# Patient Record
Sex: FEMALE | Race: OTHER | NOT HISPANIC OR LATINO | URBAN - METROPOLITAN AREA
[De-identification: names, ages, dates, MRNs, and addresses within clinical notes are randomized per-mention and may not be internally consistent; named-entity substitution may affect disease eponyms.]

---

## 2017-06-29 PROBLEM — Z00.00 ENCOUNTER FOR PREVENTIVE HEALTH EXAMINATION: Status: ACTIVE | Noted: 2017-06-29

## 2017-07-05 ENCOUNTER — OUTPATIENT (OUTPATIENT)
Dept: OUTPATIENT SERVICES | Facility: HOSPITAL | Age: 82
LOS: 1 days | Discharge: HOME | End: 2017-07-05

## 2017-07-05 ENCOUNTER — APPOINTMENT (OUTPATIENT)
Dept: SPEECH THERAPY | Facility: HOSPITAL | Age: 82
End: 2017-07-05

## 2017-07-05 ENCOUNTER — APPOINTMENT (OUTPATIENT)
Dept: RADIOLOGY | Facility: HOSPITAL | Age: 82
End: 2017-07-05

## 2017-07-05 ENCOUNTER — OUTPATIENT (OUTPATIENT)
Dept: OUTPATIENT SERVICES | Facility: HOSPITAL | Age: 82
LOS: 1 days | End: 2017-07-05

## 2017-07-05 DIAGNOSIS — R13.10 DYSPHAGIA, UNSPECIFIED: ICD-10-CM

## 2017-07-07 DIAGNOSIS — R13.19 OTHER DYSPHAGIA: ICD-10-CM

## 2018-09-02 ENCOUNTER — INPATIENT (INPATIENT)
Facility: HOSPITAL | Age: 83
LOS: 1 days | Discharge: HOME | End: 2018-09-04
Attending: HOSPITALIST | Admitting: HOSPITALIST
Payer: COMMERCIAL

## 2018-09-02 VITALS
TEMPERATURE: 99 F | SYSTOLIC BLOOD PRESSURE: 121 MMHG | DIASTOLIC BLOOD PRESSURE: 60 MMHG | OXYGEN SATURATION: 99 % | HEART RATE: 68 BPM | RESPIRATION RATE: 18 BRPM

## 2018-09-02 DIAGNOSIS — E78.5 HYPERLIPIDEMIA, UNSPECIFIED: ICD-10-CM

## 2018-09-02 DIAGNOSIS — Z29.9 ENCOUNTER FOR PROPHYLACTIC MEASURES, UNSPECIFIED: ICD-10-CM

## 2018-09-02 DIAGNOSIS — J18.9 PNEUMONIA, UNSPECIFIED ORGANISM: ICD-10-CM

## 2018-09-02 DIAGNOSIS — G62.9 POLYNEUROPATHY, UNSPECIFIED: ICD-10-CM

## 2018-09-02 DIAGNOSIS — I10 ESSENTIAL (PRIMARY) HYPERTENSION: ICD-10-CM

## 2018-09-02 DIAGNOSIS — D56.3 THALASSEMIA MINOR: ICD-10-CM

## 2018-09-02 LAB
ALBUMIN SERPL ELPH-MCNC: 3.7 G/DL — SIGNIFICANT CHANGE UP (ref 3.3–5)
ALP SERPL-CCNC: 68 U/L — SIGNIFICANT CHANGE UP (ref 40–120)
ALT FLD-CCNC: 22 U/L — SIGNIFICANT CHANGE UP (ref 4–33)
ANISOCYTOSIS BLD QL: SLIGHT — SIGNIFICANT CHANGE UP
APPEARANCE UR: CLEAR — SIGNIFICANT CHANGE UP
AST SERPL-CCNC: 26 U/L — SIGNIFICANT CHANGE UP (ref 4–32)
B PERT DNA SPEC QL NAA+PROBE: SIGNIFICANT CHANGE UP
BACTERIA # UR AUTO: NEGATIVE — SIGNIFICANT CHANGE UP
BASE EXCESS BLDV CALC-SCNC: 1 MMOL/L — SIGNIFICANT CHANGE UP
BASOPHILS # BLD AUTO: 0.02 K/UL — SIGNIFICANT CHANGE UP (ref 0–0.2)
BASOPHILS NFR BLD AUTO: 0.2 % — SIGNIFICANT CHANGE UP (ref 0–2)
BASOPHILS NFR SPEC: 0 % — SIGNIFICANT CHANGE UP (ref 0–2)
BILIRUB SERPL-MCNC: 0.8 MG/DL — SIGNIFICANT CHANGE UP (ref 0.2–1.2)
BILIRUB UR-MCNC: NEGATIVE — SIGNIFICANT CHANGE UP
BLASTS # FLD: 0 % — SIGNIFICANT CHANGE UP (ref 0–0)
BLOOD GAS VENOUS - CREATININE: 0.66 MG/DL — SIGNIFICANT CHANGE UP (ref 0.5–1.3)
BLOOD UR QL VISUAL: SIGNIFICANT CHANGE UP
BUN SERPL-MCNC: 26 MG/DL — HIGH (ref 7–23)
C PNEUM DNA SPEC QL NAA+PROBE: NOT DETECTED — SIGNIFICANT CHANGE UP
CALCIUM SERPL-MCNC: 9.6 MG/DL — SIGNIFICANT CHANGE UP (ref 8.4–10.5)
CHLORIDE BLDV-SCNC: 110 MMOL/L — HIGH (ref 96–108)
CHLORIDE SERPL-SCNC: 104 MMOL/L — SIGNIFICANT CHANGE UP (ref 98–107)
CO2 SERPL-SCNC: 23 MMOL/L — SIGNIFICANT CHANGE UP (ref 22–31)
COLOR SPEC: SIGNIFICANT CHANGE UP
CREAT SERPL-MCNC: 0.79 MG/DL — SIGNIFICANT CHANGE UP (ref 0.5–1.3)
DACRYOCYTES BLD QL SMEAR: SLIGHT — SIGNIFICANT CHANGE UP
ELLIPTOCYTES BLD QL SMEAR: SLIGHT — SIGNIFICANT CHANGE UP
EOSINOPHIL # BLD AUTO: 0.02 K/UL — SIGNIFICANT CHANGE UP (ref 0–0.5)
EOSINOPHIL NFR BLD AUTO: 0.2 % — SIGNIFICANT CHANGE UP (ref 0–6)
EOSINOPHIL NFR FLD: 0.9 % — SIGNIFICANT CHANGE UP (ref 0–6)
FLUAV H1 2009 PAND RNA SPEC QL NAA+PROBE: NOT DETECTED — SIGNIFICANT CHANGE UP
FLUAV H1 RNA SPEC QL NAA+PROBE: NOT DETECTED — SIGNIFICANT CHANGE UP
FLUAV H3 RNA SPEC QL NAA+PROBE: NOT DETECTED — SIGNIFICANT CHANGE UP
FLUAV SUBTYP SPEC NAA+PROBE: SIGNIFICANT CHANGE UP
FLUBV RNA SPEC QL NAA+PROBE: NOT DETECTED — SIGNIFICANT CHANGE UP
GAS PNL BLDV: 138 MMOL/L — SIGNIFICANT CHANGE UP (ref 136–146)
GIANT PLATELETS BLD QL SMEAR: PRESENT — SIGNIFICANT CHANGE UP
GLUCOSE BLDV-MCNC: 105 — HIGH (ref 70–99)
GLUCOSE SERPL-MCNC: 110 MG/DL — HIGH (ref 70–99)
GLUCOSE UR-MCNC: NEGATIVE — SIGNIFICANT CHANGE UP
GRAM STN SPT: SIGNIFICANT CHANGE UP
HADV DNA SPEC QL NAA+PROBE: NOT DETECTED — SIGNIFICANT CHANGE UP
HCO3 BLDV-SCNC: 25 MMOL/L — SIGNIFICANT CHANGE UP (ref 20–27)
HCOV 229E RNA SPEC QL NAA+PROBE: NOT DETECTED — SIGNIFICANT CHANGE UP
HCOV HKU1 RNA SPEC QL NAA+PROBE: NOT DETECTED — SIGNIFICANT CHANGE UP
HCOV NL63 RNA SPEC QL NAA+PROBE: NOT DETECTED — SIGNIFICANT CHANGE UP
HCOV OC43 RNA SPEC QL NAA+PROBE: NOT DETECTED — SIGNIFICANT CHANGE UP
HCT VFR BLD CALC: 32.2 % — LOW (ref 34.5–45)
HCT VFR BLDV CALC: 32.6 % — LOW (ref 34.5–45)
HGB BLD-MCNC: 10.2 G/DL — LOW (ref 11.5–15.5)
HGB BLDV-MCNC: 10.6 G/DL — LOW (ref 11.5–15.5)
HMPV RNA SPEC QL NAA+PROBE: NOT DETECTED — SIGNIFICANT CHANGE UP
HPIV1 RNA SPEC QL NAA+PROBE: NOT DETECTED — SIGNIFICANT CHANGE UP
HPIV2 RNA SPEC QL NAA+PROBE: NOT DETECTED — SIGNIFICANT CHANGE UP
HPIV3 RNA SPEC QL NAA+PROBE: NOT DETECTED — SIGNIFICANT CHANGE UP
HPIV4 RNA SPEC QL NAA+PROBE: NOT DETECTED — SIGNIFICANT CHANGE UP
HYALINE CASTS # UR AUTO: NEGATIVE — SIGNIFICANT CHANGE UP
HYPOCHROMIA BLD QL: SIGNIFICANT CHANGE UP
IMM GRANULOCYTES # BLD AUTO: 0.06 # — SIGNIFICANT CHANGE UP
IMM GRANULOCYTES NFR BLD AUTO: 0.6 % — SIGNIFICANT CHANGE UP (ref 0–1.5)
KETONES UR-MCNC: NEGATIVE — SIGNIFICANT CHANGE UP
LACTATE BLDV-MCNC: 1.2 MMOL/L — SIGNIFICANT CHANGE UP (ref 0.5–2)
LEUKOCYTE ESTERASE UR-ACNC: SIGNIFICANT CHANGE UP
LYMPHOCYTES # BLD AUTO: 0.97 K/UL — LOW (ref 1–3.3)
LYMPHOCYTES # BLD AUTO: 9.4 % — LOW (ref 13–44)
LYMPHOCYTES NFR SPEC AUTO: 13.9 % — SIGNIFICANT CHANGE UP (ref 13–44)
M PNEUMO DNA SPEC QL NAA+PROBE: NOT DETECTED — SIGNIFICANT CHANGE UP
MCHC RBC-ENTMCNC: 20 PG — LOW (ref 27–34)
MCHC RBC-ENTMCNC: 31.7 % — LOW (ref 32–36)
MCV RBC AUTO: 63.3 FL — LOW (ref 80–100)
METAMYELOCYTES # FLD: 0 % — SIGNIFICANT CHANGE UP (ref 0–1)
MICROCYTES BLD QL: SIGNIFICANT CHANGE UP
MONOCYTES # BLD AUTO: 1.03 K/UL — HIGH (ref 0–0.9)
MONOCYTES NFR BLD AUTO: 10 % — SIGNIFICANT CHANGE UP (ref 2–14)
MONOCYTES NFR BLD: 5.2 % — SIGNIFICANT CHANGE UP (ref 2–9)
MYELOCYTES NFR BLD: 0 % — SIGNIFICANT CHANGE UP (ref 0–0)
NEUTROPHIL AB SER-ACNC: 75.7 % — SIGNIFICANT CHANGE UP (ref 43–77)
NEUTROPHILS # BLD AUTO: 8.21 K/UL — HIGH (ref 1.8–7.4)
NEUTROPHILS NFR BLD AUTO: 79.6 % — HIGH (ref 43–77)
NEUTS BAND # BLD: 1.7 % — SIGNIFICANT CHANGE UP (ref 0–6)
NITRITE UR-MCNC: NEGATIVE — SIGNIFICANT CHANGE UP
NRBC # FLD: 0 — SIGNIFICANT CHANGE UP
OTHER - HEMATOLOGY %: 0 — SIGNIFICANT CHANGE UP
PCO2 BLDV: 39 MMHG — LOW (ref 41–51)
PH BLDV: 7.43 PH — SIGNIFICANT CHANGE UP (ref 7.32–7.43)
PH UR: 7.5 — SIGNIFICANT CHANGE UP (ref 5–8)
PLATELET # BLD AUTO: 193 K/UL — SIGNIFICANT CHANGE UP (ref 150–400)
PLATELET COUNT - ESTIMATE: NORMAL — SIGNIFICANT CHANGE UP
PMV BLD: 10.5 FL — SIGNIFICANT CHANGE UP (ref 7–13)
PO2 BLDV: 30 MMHG — LOW (ref 35–40)
POIKILOCYTOSIS BLD QL AUTO: SLIGHT — SIGNIFICANT CHANGE UP
POLYCHROMASIA BLD QL SMEAR: SLIGHT — SIGNIFICANT CHANGE UP
POTASSIUM BLDV-SCNC: 4.2 MMOL/L — SIGNIFICANT CHANGE UP (ref 3.4–4.5)
POTASSIUM SERPL-MCNC: 4.4 MMOL/L — SIGNIFICANT CHANGE UP (ref 3.5–5.3)
POTASSIUM SERPL-SCNC: 4.4 MMOL/L — SIGNIFICANT CHANGE UP (ref 3.5–5.3)
PROMYELOCYTES # FLD: 0 % — SIGNIFICANT CHANGE UP (ref 0–0)
PROT SERPL-MCNC: 6.8 G/DL — SIGNIFICANT CHANGE UP (ref 6–8.3)
PROT UR-MCNC: NEGATIVE — SIGNIFICANT CHANGE UP
RBC # BLD: 5.09 M/UL — SIGNIFICANT CHANGE UP (ref 3.8–5.2)
RBC # FLD: 16.3 % — HIGH (ref 10.3–14.5)
RBC CASTS # UR COMP ASSIST: HIGH (ref 0–?)
RSV RNA SPEC QL NAA+PROBE: NOT DETECTED — SIGNIFICANT CHANGE UP
RV+EV RNA SPEC QL NAA+PROBE: NOT DETECTED — SIGNIFICANT CHANGE UP
SAO2 % BLDV: 50 % — LOW (ref 60–85)
SODIUM SERPL-SCNC: 141 MMOL/L — SIGNIFICANT CHANGE UP (ref 135–145)
SP GR SPEC: 1.01 — SIGNIFICANT CHANGE UP (ref 1–1.04)
SPECIMEN SOURCE: SIGNIFICANT CHANGE UP
SQUAMOUS # UR AUTO: SIGNIFICANT CHANGE UP
TROPONIN T, HIGH SENSITIVITY: 22 NG/L — SIGNIFICANT CHANGE UP (ref ?–14)
TROPONIN T, HIGH SENSITIVITY: 23 NG/L — SIGNIFICANT CHANGE UP (ref ?–14)
UROBILINOGEN FLD QL: NORMAL — SIGNIFICANT CHANGE UP
VARIANT LYMPHS # BLD: 2.6 % — SIGNIFICANT CHANGE UP
WBC # BLD: 10.31 K/UL — SIGNIFICANT CHANGE UP (ref 3.8–10.5)
WBC # FLD AUTO: 10.31 K/UL — SIGNIFICANT CHANGE UP (ref 3.8–10.5)
WBC UR QL: SIGNIFICANT CHANGE UP (ref 0–?)

## 2018-09-02 PROCEDURE — 71046 X-RAY EXAM CHEST 2 VIEWS: CPT | Mod: 26

## 2018-09-02 PROCEDURE — 99254 IP/OBS CNSLTJ NEW/EST MOD 60: CPT

## 2018-09-02 PROCEDURE — 71250 CT THORAX DX C-: CPT | Mod: 26

## 2018-09-02 RX ORDER — LOSARTAN POTASSIUM 100 MG/1
1 TABLET, FILM COATED ORAL
Qty: 0 | Refills: 0 | COMMUNITY

## 2018-09-02 RX ORDER — SIMVASTATIN 20 MG/1
20 TABLET, FILM COATED ORAL AT BEDTIME
Qty: 0 | Refills: 0 | Status: DISCONTINUED | OUTPATIENT
Start: 2018-09-02 | End: 2018-09-04

## 2018-09-02 RX ORDER — INFLUENZA VIRUS VACCINE 15; 15; 15; 15 UG/.5ML; UG/.5ML; UG/.5ML; UG/.5ML
0.5 SUSPENSION INTRAMUSCULAR ONCE
Qty: 0 | Refills: 0 | Status: COMPLETED | OUTPATIENT
Start: 2018-09-02 | End: 2018-09-04

## 2018-09-02 RX ORDER — GABAPENTIN 400 MG/1
300 CAPSULE ORAL
Qty: 0 | Refills: 0 | Status: DISCONTINUED | OUTPATIENT
Start: 2018-09-02 | End: 2018-09-04

## 2018-09-02 RX ORDER — GABAPENTIN 400 MG/1
1 CAPSULE ORAL
Qty: 0 | Refills: 0 | COMMUNITY

## 2018-09-02 RX ORDER — NIFEDIPINE 30 MG
30 TABLET, EXTENDED RELEASE 24 HR ORAL DAILY
Qty: 0 | Refills: 0 | Status: DISCONTINUED | OUTPATIENT
Start: 2018-09-02 | End: 2018-09-04

## 2018-09-02 RX ORDER — LOSARTAN POTASSIUM 100 MG/1
100 TABLET, FILM COATED ORAL DAILY
Qty: 0 | Refills: 0 | Status: DISCONTINUED | OUTPATIENT
Start: 2018-09-02 | End: 2018-09-04

## 2018-09-02 RX ORDER — SIMVASTATIN 20 MG/1
1 TABLET, FILM COATED ORAL
Qty: 0 | Refills: 0 | COMMUNITY

## 2018-09-02 RX ORDER — GABAPENTIN 400 MG/1
300 CAPSULE ORAL
Qty: 0 | Refills: 0 | COMMUNITY

## 2018-09-02 RX ORDER — ALBUTEROL 90 UG/1
2.5 AEROSOL, METERED ORAL ONCE
Qty: 0 | Refills: 0 | Status: COMPLETED | OUTPATIENT
Start: 2018-09-02 | End: 2018-09-02

## 2018-09-02 RX ORDER — DEXAMETHASONE 0.5 MG/5ML
10 ELIXIR ORAL ONCE
Qty: 0 | Refills: 0 | Status: COMPLETED | OUTPATIENT
Start: 2018-09-02 | End: 2018-09-02

## 2018-09-02 RX ORDER — GABAPENTIN 400 MG/1
300 CAPSULE ORAL
Qty: 0 | Refills: 0 | Status: DISCONTINUED | OUTPATIENT
Start: 2018-09-02 | End: 2018-09-02

## 2018-09-02 RX ORDER — GABAPENTIN 400 MG/1
300 CAPSULE ORAL THREE TIMES A DAY
Qty: 0 | Refills: 0 | Status: DISCONTINUED | OUTPATIENT
Start: 2018-09-02 | End: 2018-09-02

## 2018-09-02 RX ADMIN — GABAPENTIN 300 MILLIGRAM(S): 400 CAPSULE ORAL at 18:22

## 2018-09-02 RX ADMIN — Medication 102 MILLIGRAM(S): at 08:54

## 2018-09-02 RX ADMIN — ALBUTEROL 2.5 MILLIGRAM(S): 90 AEROSOL, METERED ORAL at 08:07

## 2018-09-02 RX ADMIN — SIMVASTATIN 20 MILLIGRAM(S): 20 TABLET, FILM COATED ORAL at 21:17

## 2018-09-02 RX ADMIN — LOSARTAN POTASSIUM 100 MILLIGRAM(S): 100 TABLET, FILM COATED ORAL at 18:22

## 2018-09-02 NOTE — ED ADULT NURSE NOTE - OBJECTIVE STATEMENT
Patient received in room #10 c/o intermittent fevers, chest/throat/ear pain when coughing. Patient A&Ox3, ambulatory. Patient reports last taking motrin and tylenol around midnight. Patient is hard of hearing, family at bedside. Vs as noted. 20G IV placed in right ac, labs drawn and sent. Will monitor.

## 2018-09-02 NOTE — H&P ADULT - PROBLEM SELECTOR PLAN 2
concern for RUBY seen on CT chest   -will obtain sputum culture x2; AFB   -may need pulmonology consult for possible bronchoscopy   -consider ID consult for antibiotic regimen / duration; can consider Azithromycin + Rifampin (check EKG for baseline QT interval)

## 2018-09-02 NOTE — H&P ADULT - RS GEN PE MLT RESP DETAILS PC
good air movement/normal/airway patent/respirations non-labored/breath sounds equal/clear to auscultation bilaterally

## 2018-09-02 NOTE — ED ADULT TRIAGE NOTE - CHIEF COMPLAINT QUOTE
pt. came in c/o mid chest pain started 3 hrs ago after coughing. pt. also c/o feeling SOB, productive cough (yellowish phlegm), back pain and intermittent fever x 3-4 days. pt. speaks in full sentences, lung sounds clear in triage. PMHx: HTN, Osteoporosis

## 2018-09-02 NOTE — H&P ADULT - FAMILY HISTORY
Sibling  Still living? Unknown  Family history of Parkinson disease, Age at diagnosis: Age Unknown  Family history of primary TB, Age at diagnosis: Age Unknown  Family history of colon cancer, Age at diagnosis: Age Unknown  Family history of diabetes mellitus, Age at diagnosis: Age Unknown

## 2018-09-02 NOTE — PATIENT PROFILE ADULT. - ABILITY TO HEAR (WITH HEARING AID OR HEARING APPLIANCE IF NORMALLY USED):
Mildly to Moderately Impaired: difficulty hearing in some environments or speaker may need to increase volume or speak distinctly/uses hearing aides at times

## 2018-09-02 NOTE — H&P ADULT - NSHPLABSRESULTS_GEN_ALL_CORE
.  Labs reviewed personally.                          10.2   10.31 )-----------( 193      ( 02 Sep 2018 07:26 )             32.2     Hgb Trend: 10.2<--      141  |  104  |  26<H>  ----------------------------<  110<H>  4.4   |  23  |  0.79    Ca    9.6      02 Sep 2018 07:26    TPro  6.8  /  Alb  3.7  /  TBili  0.8  /  DBili  x   /  AST  26  /  ALT  22  /  AlkPhos  68      Creatinine Trend: 0.79<--    Urinalysis Basic - ( 02 Sep 2018 07:30 )    Color: LIGHT YELLOW / Appearance: CLEAR / S.012 / pH: 7.5  Gluc: NEGATIVE / Ketone: NEGATIVE  / Bili: NEGATIVE / Urobili: NORMAL   Blood: SMALL / Protein: NEGATIVE / Nitrite: NEGATIVE   Leuk Esterase: MODERATE / RBC: 11-25 / WBC 10-20   Sq Epi: OCC / Non Sq Epi: x / Bacteria: NEGATIVE        Imaging reviewed personally.

## 2018-09-02 NOTE — H&P ADULT - HISTORY OF PRESENT ILLNESS
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 4 days of cough. Patient is poor historian son at bedside able to assist in chronicity.     States that initially patient was evaluated for conjunctivitis on Wednesday Patient developed red eye initially one sided then bilateral w/ itching, tearing as well as slight sore throat and cough. Cough was productive yellow/green sputum w/o blood. Endorsed fevers, chills no night sweats. She went to urgent care center then to her PCP and was continued on eye drops. Patient continued to have fevers, and progressively worsening cough. She was subsequently brought to the hospital by her children. Patient has niece who has recently been ill w/ cough. Also patient has recently come from [place] approx. 3 weeks ago.  She was born in [facility], migrated [place] the [place] >[place] ago. Lives w/ adult children. endorses 3lbs weight loss in last 1 month,  Denies loss of appetite, no night sweats, chest pain, SOB.      In the ER: VS Tmax: 98.2 HR 68 /60  RR 18 99% on RA

## 2018-09-02 NOTE — ED ADULT NURSE NOTE - NSIMPLEMENTINTERV_GEN_ALL_ED
Implemented All Fall with Harm Risk Interventions:  [place] to call system. Call bell, personal items and telephone within reach. Instruct patient to call for assistance. Room bathroom lighting operational. Non-slip footwear when patient is off stretcher. Physically safe environment: no spills, clutter or unnecessary equipment. Stretcher in lowest position, wheels locked, appropriate side rails in place. Provide visual cue, wrist band, yellow gown, etc. Monitor gait and stability. Monitor for mental status changes and reorient to person, place, and time. Review medications for side effects contributing to fall risk. Reinforce activity limits and safety measures with patient and family. Provide visual clues: red socks.

## 2018-09-02 NOTE — ED PROVIDER NOTE - PROGRESS NOTE DETAILS
JACOB Torres: Pt signed out to me from night team. pt doing well, rectal temp afebrile, pending labs and CXR and UA if negative will dc with out patient f.u JACOB Torres: CT with concern for multi level PNA vs. RUBY. admitted to [facility] hospitalist.

## 2018-09-02 NOTE — H&P ADULT - ASSESSMENT
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

## 2018-09-02 NOTE — H&P ADULT - PROBLEM SELECTOR PLAN 5
patient w/ known diagnosis x2 miscarriages, will continue to monitor hgb, patient asymptomatic at this time c/w home gabapentin 300mg TID

## 2018-09-02 NOTE — H&P ADULT - ATTENDING COMMENTS
Chart reviewed. Vitals and Labs noted.   Pt seen and examined at bedside. Plan formulated with the resident/PA/NP. Detail H&P as above.     Admitted for cough and productive green/yellow sputum of 8 days duration. No sick contact. mild leukocytosis.  Low grade fever 100F at home. Clinically well, nontoxic. Able to walk with a cane. lungs with minimal dec breath sounds. not coarse.  Heart regular rate and rhythm. mild dyspnea but no cp, no n/v/d. no abd pain. no HA/dizziness.   The son at bedside.  Symptomatically improved on Levaquin IV. will c/w abx.   CT chest shows infectious bronchiolitis. Also with areas of mucoid impaction and chronic atelectasis/scarring within the right middle lobe and lingula is concerning for atypical mycobacterial infection. Sputum cultures sent. Urine Legionella Ag ordered.   pulm consult pending.   DVT ppx (early ambulation)    d/w pt and the son extensively at bedside.     - Dr. BEENA Rogers ([facility])  - [phone]

## 2018-09-02 NOTE — H&P ADULT - NSHPPHYSICALEXAM_GEN_ALL_CORE
Vital Signs Last 24 Hrs  T(C): 36.6 (02 Sep 2018 13:29), Max: 37.1 (02 Sep 2018 05:56)  T(F): 97.8 (02 Sep 2018 13:29), Max: 98.8 (02 Sep 2018 05:56)  HR: 76 (02 Sep 2018 13:29) (62 - 76)  BP: 122/63 (02 Sep 2018 13:29) (121/60 - 135/57)  BP(mean): --  RR: 17 (02 Sep 2018 13:29) (17 - 18)  SpO2: 97% (02 Sep 2018 13:29) (97% - 100%)

## 2018-09-02 NOTE — H&P ADULT - PROBLEM SELECTOR PLAN 1
symptomatic + radiologic findings   -will treat for CAP at this time, c/w Levaquin 750mG OD for 5 days; fingerstick checks BID for drug induced hypoglycemia, check EKG for QTC interval   r/o RUBY as below

## 2018-09-02 NOTE — H&P ADULT - PMH
H/O hyperlipidemia    HTN (hypertension) Beta thalassemia minor    H/O hyperlipidemia    HTN (hypertension)    Meningitis

## 2018-09-02 NOTE — ED PROVIDER NOTE - ATTENDING CONTRIBUTION TO CARE
Joshua PALMER- 89 Y/O F with h/o htn, hld p/w dry cough for 1 wk and earlier has uri type symptoms and now had fever, chills, malaise and worsening cough for 3 days.  Pt had pna 1 month ago, no nausea, vomiting, diarrhea.    pt is alert, well appearing female, s1s2 normal reg, b/l rhonchi , abd soft, nt, nd, normal reg, neuro exam aox3, cn 2-12 intact, skin warm, dry, good turgor, peerl eomi    plan to r/o pna, ekg nsr no changes, will check labs, will also given albuterol, solumedrol for dry cough

## 2018-09-02 NOTE — H&P ADULT - PROBLEM SELECTOR PLAN 6
IMPROVE SCORE 1  dvt ppx: SCDs while in bed. patient w/ known diagnosis x2 miscarriages, will continue to monitor hgb, patient asymptomatic at this time

## 2018-09-03 ENCOUNTER — TRANSCRIPTION ENCOUNTER (OUTPATIENT)
Age: 83
End: 2018-09-03

## 2018-09-03 LAB
BACTERIA UR CULT: SIGNIFICANT CHANGE UP
BASOPHILS # BLD AUTO: 0.02 K/UL — SIGNIFICANT CHANGE UP (ref 0–0.2)
BASOPHILS NFR BLD AUTO: 0.2 % — SIGNIFICANT CHANGE UP (ref 0–2)
BUN SERPL-MCNC: 22 MG/DL — SIGNIFICANT CHANGE UP (ref 7–23)
CALCIUM SERPL-MCNC: 9.1 MG/DL — SIGNIFICANT CHANGE UP (ref 8.4–10.5)
CHLORIDE SERPL-SCNC: 105 MMOL/L — SIGNIFICANT CHANGE UP (ref 98–107)
CO2 SERPL-SCNC: 21 MMOL/L — LOW (ref 22–31)
CREAT SERPL-MCNC: 0.75 MG/DL — SIGNIFICANT CHANGE UP (ref 0.5–1.3)
CULTURE - ACID FAST SMEAR CONCENTRATED: SIGNIFICANT CHANGE UP
EOSINOPHIL # BLD AUTO: 0 K/UL — SIGNIFICANT CHANGE UP (ref 0–0.5)
EOSINOPHIL NFR BLD AUTO: 0 % — SIGNIFICANT CHANGE UP (ref 0–6)
GLUCOSE SERPL-MCNC: 147 MG/DL — HIGH (ref 70–99)
GRAM STN SPT: SIGNIFICANT CHANGE UP
HCT VFR BLD CALC: 31.4 % — LOW (ref 34.5–45)
HGB BLD-MCNC: 9.9 G/DL — LOW (ref 11.5–15.5)
IMM GRANULOCYTES # BLD AUTO: 0.11 # — SIGNIFICANT CHANGE UP
IMM GRANULOCYTES NFR BLD AUTO: 1 % — SIGNIFICANT CHANGE UP (ref 0–1.5)
LYMPHOCYTES # BLD AUTO: 0.82 K/UL — LOW (ref 1–3.3)
LYMPHOCYTES # BLD AUTO: 7.7 % — LOW (ref 13–44)
MAGNESIUM SERPL-MCNC: 2.1 MG/DL — SIGNIFICANT CHANGE UP (ref 1.6–2.6)
MCHC RBC-ENTMCNC: 19.8 PG — LOW (ref 27–34)
MCHC RBC-ENTMCNC: 31.5 % — LOW (ref 32–36)
MCV RBC AUTO: 62.7 FL — LOW (ref 80–100)
MONOCYTES # BLD AUTO: 0.66 K/UL — SIGNIFICANT CHANGE UP (ref 0–0.9)
MONOCYTES NFR BLD AUTO: 6.2 % — SIGNIFICANT CHANGE UP (ref 2–14)
NEUTROPHILS # BLD AUTO: 9.07 K/UL — HIGH (ref 1.8–7.4)
NEUTROPHILS NFR BLD AUTO: 84.9 % — HIGH (ref 43–77)
NRBC # FLD: 0 — SIGNIFICANT CHANGE UP
PHOSPHATE SERPL-MCNC: 3 MG/DL — SIGNIFICANT CHANGE UP (ref 2.5–4.5)
PLATELET # BLD AUTO: 207 K/UL — SIGNIFICANT CHANGE UP (ref 150–400)
PMV BLD: 10.4 FL — SIGNIFICANT CHANGE UP (ref 7–13)
POTASSIUM SERPL-MCNC: 4 MMOL/L — SIGNIFICANT CHANGE UP (ref 3.5–5.3)
POTASSIUM SERPL-SCNC: 4 MMOL/L — SIGNIFICANT CHANGE UP (ref 3.5–5.3)
RBC # BLD: 5.01 M/UL — SIGNIFICANT CHANGE UP (ref 3.8–5.2)
RBC # FLD: 16.1 % — HIGH (ref 10.3–14.5)
SODIUM SERPL-SCNC: 140 MMOL/L — SIGNIFICANT CHANGE UP (ref 135–145)
SPECIMEN SOURCE: SIGNIFICANT CHANGE UP
WBC # BLD: 10.68 K/UL — HIGH (ref 3.8–10.5)
WBC # FLD AUTO: 10.68 K/UL — HIGH (ref 3.8–10.5)

## 2018-09-03 RX ORDER — LANOLIN ALCOHOL/MO/W.PET/CERES
3 CREAM (GRAM) TOPICAL AT BEDTIME
Qty: 0 | Refills: 0 | Status: DISCONTINUED | OUTPATIENT
Start: 2018-09-03 | End: 2018-09-04

## 2018-09-03 RX ADMIN — Medication 200 MILLIGRAM(S): at 23:32

## 2018-09-03 RX ADMIN — GABAPENTIN 300 MILLIGRAM(S): 400 CAPSULE ORAL at 05:52

## 2018-09-03 RX ADMIN — Medication 3 MILLIGRAM(S): at 21:19

## 2018-09-03 RX ADMIN — GABAPENTIN 300 MILLIGRAM(S): 400 CAPSULE ORAL at 17:39

## 2018-09-03 RX ADMIN — LOSARTAN POTASSIUM 100 MILLIGRAM(S): 100 TABLET, FILM COATED ORAL at 05:52

## 2018-09-03 RX ADMIN — Medication 30 MILLIGRAM(S): at 05:52

## 2018-09-03 RX ADMIN — SIMVASTATIN 20 MILLIGRAM(S): 20 TABLET, FILM COATED ORAL at 21:19

## 2018-09-03 NOTE — DISCHARGE NOTE ADULT - HOME CARE AGENCY
[facility] [phone] [facility], HOME CARE ;  [phone] for RN day after discharge, followed by home phys therapy

## 2018-09-03 NOTE — DISCHARGE NOTE ADULT - ABILITY TO HEAR (WITH HEARING AID OR HEARING APPLIANCE IF NORMALLY USED):
uses hearing aides at times/Mildly to Moderately Impaired: difficulty hearing in some environments or speaker may need to increase volume or speak distinctly

## 2018-09-03 NOTE — DISCHARGE NOTE ADULT - INSTRUCTIONS
dysphagia 3 soft diet with nectar thick liquids  follow up outpatient for cineesophagogram- Call Radiology at [phone] for outpatient scheduling.

## 2018-09-03 NOTE — DISCHARGE NOTE ADULT - MEDICATION SUMMARY - MEDICATIONS TO TAKE
I will START or STAY ON the medications listed below when I get home from the hospital:    losartan 100 mg oral tablet  -- 1 tab(s) by mouth once a day  -- Indication: For High blood pressure    gabapentin 300 mg oral capsule  -- 1 cap(s) by mouth 2 times a day  -- Indication: For Neuropathy    simvastatin 20 mg oral tablet  -- 1 tab(s) by mouth once a day (at bedtime)  -- Indication: For HLD (hyperlipidemia)    NIFEdipine 30 mg oral tablet, extended release  -- 1 tab(s) by mouth once a day  -- Indication: For High blood pressure    guaiFENesin 100 mg/5 mL oral liquid  -- 10 milliliter(s) by mouth every 6 hours, As needed, Cough  -- Indication: For cough    Levaquin 750 mg oral tablet  -- 1 tab(s) by mouth every 48 hours x 4 days   Take dose on 9/6 & 9/8  -- Avoid prolonged or excessive exposure to direct and/or artificial sunlight while taking this medication.  Do not take dairy products, antacids, or iron preparations within one hour of this medication.  Finish all this medication unless otherwise directed by prescriber.  May cause drowsiness or dizziness.  Medication should be taken with plenty of water.    -- Indication: For Pneumonia

## 2018-09-03 NOTE — DISCHARGE NOTE ADULT - CARE PROVIDER_API CALL
Gaudencio Rogers (), Internal Medicine  [address] [place] [address]  Phone: [phone]  Fax: [phone] Gaudencio Rogers (), Internal Medicine  [address] [place] [address]  Phone: [phone]  Fax: [phone]    Gael Jewell), Cardiovascular Disease; Internal Medicine  [address] [place] [address]  Phone: [phone]  Fax: [phone]

## 2018-09-03 NOTE — CONSULT NOTE ADULT - SUBJECTIVE AND OBJECTIVE BOX
CHIEF COMPLAINT:    HPI:  89 F with history of HTN, HLD, thalassemia minor, and prior Listeria meningitis here with 4 days of cough. Cough ranges from dry to sometimes productive white sputum. Saw her PCP who checked a CXR which was negative. Her daughter brought her to the hospital for persistent symptoms and low grade fever. She lives in [place] and has been visiting daughter for past three weeks. Originally born in [facility]. Has lost around 3 lbs on past month. Denies night sweats and hemoptysis. Has good appetite and is active. Has a questionable history of aspiration. CT chest here shows consolidations in RML and RUL, which were interpreted as possible RUBY. She has no history of prior TB or TB exposure. Does not recall whether she was ever tested for TB. She is currently on Levofloxacin and feels better since initial presentation.      In the ER: VS Tmax: 98.2 HR 68 /60  RR 18 99% on RA     PAST MEDICAL & SURGICAL HISTORY:  Meningitis  Beta thalassemia minor  H/O hyperlipidemia  HTN (hypertension)  Hip fracture      FAMILY HISTORY:  Family history of diabetes mellitus (Sibling)  Family history of colon cancer (Sibling)  Family history of primary TB (Sibling)  Family history of Parkinson disease (Sibling)      SOCIAL HISTORY:  Smoking: [x] Never Smoked [ ] Former Smoker (__ packs x ___ years) [ ] Current Smoker  (__ packs x ___ years)  Substance Use: [x] Never Used [ ] Used ____  EtOH Use: Denies  Marital Status: [ ] Single [ ]  [ ]  [ ]   Sexual History:   Occupation:  Recent Travel:  Country of Birth:  Advance Directives:    Allergies    No Known Allergies    Intolerances        HOME MEDICATIONS:    REVIEW OF SYSTEMS:  Constitutional: No fevers since admission  HEENT: [ ] negative [ ] dry eyes [ ] eye irritation [ ] postnasal drip  CV: [ ] negative  [-] chest pain [-] orthopnea [-] palpitations [ ] murmur  Resp: [ ] negative [+] cough [+] shortness of breath [ ] dyspnea [-] wheezing [-] sputum [-] hemoptysis  GI: [ ] negative [-] nausea [-] vomiting [-] diarrhea [-] constipation [-] abd pain [ ] dysphagia   : [ ] negative [-] dysuria [ ] nocturia [ ] hematuria [ ] increased urinary frequency  Musculoskeletal: [ ] negative [ ] back pain [-] myalgias [-] arthralgias [ ] fracture  Skin: [ ] negative [-] rash [ ] itch  Neurological: [ ] negative [-] headache [-] dizziness [ ] syncope [ ] weakness [ ] numbness  Psychiatric: [ ] negative [ ] anxiety [ ] depression  Endocrine: [ ] negative [ ] diabetes [ ] thyroid problem  Hematologic/Lymphatic: [ ] negative [ ] anemia [ ] bleeding problem  Allergic/Immunologic: [ ] negative [ ] itchy eyes [ ] nasal discharge [ ] hives [ ] angioedema  [ ] All other systems negative  [ ] Unable to assess ROS because ________    OBJECTIVE:  ICU Vital Signs Last 24 Hrs  T(C): 36.5 (03 Sep 2018 18:39), Max: 36.7 (02 Sep 2018 21:18)  T(F): 97.7 (03 Sep 2018 18:39), Max: 98.1 (02 Sep 2018 21:18)  HR: 76 (03 Sep 2018 18:39) (60 - 77)  BP: 144/53 (03 Sep 2018 18:39) (119/50 - 148/52)  BP(mean): --  ABP: --  ABP(mean): --  RR: 18 (03 Sep 2018 18:39) (16 - 18)  SpO2: 100% (03 Sep 2018 18:39) (98% - 100%)        CAPILLARY BLOOD GLUCOSE          PHYSICAL EXAM:  General: No apparent distress  HEENT: NC/AT  Lymph Nodes: No supraclavicular or lymphadenopathy  Neck: Supple  Respiratory: CTAB, no wheezing or crackles, good air entry  Cardiovascular: RRR, no murmur, no LE edema  Abdomen: Soft, nontender, nondistended  Extremities: Warm  Skin: Intact  Neurological: A&Ox3, no focal deficits  Psychiatry: Normal mood and affect    HOSPITAL MEDICATIONS:      losartan 100 milliGRAM(s) Oral daily  NIFEdipine XL 30 milliGRAM(s) Oral daily    simvastatin 20 milliGRAM(s) Oral at bedtime      gabapentin 300 milliGRAM(s) Oral two times a day            influenza   Vaccine 0.5 milliLiter(s) IntraMuscular once          LABS:                        9.9    10.68 )-----------( 207      ( 03 Sep 2018 05:47 )             31.4     Hgb Trend: 9.9<--, 10.2<--  09-03    140  |  105  |  22  ----------------------------<  147<H>  4.0   |  21<L>  |  0.75    Ca    9.1      03 Sep 2018 05:47  Phos  3.0       Mg     2.1         TPro  6.8  /  Alb  3.7  /  TBili  0.8  /  DBili  x   /  AST  26  /  ALT  22  /  AlkPhos  68      Creatinine Trend: 0.75<--, 0.79<--    Urinalysis Basic - ( 02 Sep 2018 07:30 )    Color: LIGHT YELLOW / Appearance: CLEAR / S.012 / pH: 7.5  Gluc: NEGATIVE / Ketone: NEGATIVE  / Bili: NEGATIVE / Urobili: NORMAL   Blood: SMALL / Protein: NEGATIVE / Nitrite: NEGATIVE   Leuk Esterase: MODERATE / RBC: 11-25 / WBC 10-20   Sq Epi: OCC / Non Sq Epi: x / Bacteria: NEGATIVE        Venous Blood Gas:   @ 07:30  7.43/39/30/25/50.0  VBG Lactate: 1.2      MICROBIOLOGY:     RADIOLOGY:  [x] Reviewed and interpreted by me    < from: CT Chest No Cont (18 @ 10:10) >    1.  Centrilobular nodules within the right upper lobe and right middle   lobe likely representing infectious bronchiolitis. Recommend follow-up in   3-4 weeks after treatment.    2.  Areas of mucoid impaction and chronic atelectasis/scarring within the   right middle lobe and lingula is concerning for atypical mycobacterial   infection such as RUBY.      < end of copied text >    ASSESSMENT AND RECOMMENDATION:    89 year old female here with cough and fever. CT chest suggestive of pneumonia.    No evidence of RUBY infection at this time  Treat for CAP - 5-7 days of levofloxacin  Sputum culture collected already for AFB - smear negative, awaiting culture  Will need repeat CT chest in 6-8 weeks to assess for resolution    Fidencio Tyler MD  Pulmonary and Critical Care Fellow  [phone]

## 2018-09-03 NOTE — DISCHARGE NOTE ADULT - PLAN OF CARE
resolution complete full course of levaquin as prescribed hemoglobin stable, follow up with PCP to continue to monitor Hgb levels. stable Pulmonary consulted- no evidence of RUBY infection. Complete full course of levaquin as prescribed. Repeat CT chest in 6-8 weeks to ensure clearance. Follow up with PCP within 1 week Pulmonary consulted- no evidence of RUBY infection. Complete full course of levaquin as prescribed, every 48 hours for 2 more doses- take on 9/6 & 9/8. Repeat CT chest in 6-8 weeks to ensure clearance. Follow up with PCP within 1 week continue losartan and procardia dysphagia 3 soft diet with nectar thick fluids. Follow up outpatient for cineesophagogram.   Call Radiology at [phone] for outpatient scheduling.

## 2018-09-03 NOTE — CONSULT NOTE ADULT - ATTENDING COMMENTS
Agree with above.     89 year old female with the above pmhx who comes in for an evaluation of PNA. Found to have tree-in bud in the RML suggestive of NTM. No fevers/chills some night sweats, and no hemoptysis. Significant travel history as she was born in [facility] but has lived most of her life in [place] and the [place]. Currently on Levaquin. Doing well, states she is better and wants to go home. AFB is negative x 1. Would suggest to continue Levaquin for a total of 5 days. She should repeat her CT scan in 6-8 weeks for resolution of her PNA. If still present, will need a speech and swallow as an outpatient to evaluate for persistent aspiration vs workup for NTM. This was discussed at length with the daughter at bedside. She can follow up with any pulmonologist:    [address] [place] [address]  [phone]

## 2018-09-03 NOTE — DISCHARGE NOTE ADULT - MEDICATION SUMMARY - MEDICATIONS TO CHANGE
I will SWITCH the dose or number of times a day I take the medications listed below when I get home from the hospital:    gabapentin 300 mg oral capsule  -- 1 cap(s) by mouth 3 times a day    gabapentin 300 mg oral capsule  -- 1 cap(s) by mouth 3 times a day

## 2018-09-03 NOTE — DISCHARGE NOTE ADULT - HOSPITAL COURSE
89yF hx htn p/w cough x 1wk w/ likely infectious Bronchiolotis. 89yF hx htn p/w cough x 1wk w/ likely infectious Bronchiolotis.     Pneumonia.    symptomatic + radiologic findings   -will treat for CAP at this time, c/w Levaquin 750mG OD for 5 days; fingerstick checks BID for drug induced hypoglycemia, check EKG for QTC interval   r/o RUBY as below.      R/O RUBY (mycobacterium avium-intracellulare   concern for RUBY seen on CT chest   -will obtain sputum culture x2; AFB   -pulm cs- no evidence of RUBY infection at this time. AFB smear negative. treat with abx for PNA 5-7 days. repeat CT chest in 6-8 weeks to assess for resolution     HTN   C/w home medications: Losartan 50mG BID, Nifedipine ER 30mG OD  -monitor vitals per floor routine.     HLD      c/w home medication simvastatin 20mG OD.      Neuropathy.   c/w home gabapentin 300mg TID. patient w/ known diagnosis x2 miscarriages      Beta thalassemia minor    patient w/ known diagnosis x2 miscarriages, will continue to monitor hgb, patient asymptomatic at this time    PT- home with home PT    stable for discharge. follow up with PCP. repeat CT chest in 6-8 weeks. complete full course of levaquin 89yF hx htn p/w cough x 1wk w/ likely infectious Bronchiolotis.     Pneumonia.    symptomatic + radiologic findings   -will treat for CAP at this time, c/w Levaquin 750mG OD for 5 days; fingerstick checks BID for drug induced hypoglycemia, check EKG for QTC interval   r/o RUBY as below.      R/O RUBY (mycobacterium avium-intracellulare   concern for RUBY seen on CT chest   -will obtain sputum culture x2; AFB   -pulm cs- no evidence of RUBY infection at this time. AFB smear negative. treat with abx for PNA 5-7 days. repeat CT chest in 6-8 weeks to assess for resolution     HTN   C/w home medications: Losartan 50mG BID, Nifedipine ER 30mG OD  -monitor vitals per floor routine.     HLD      c/w home medication simvastatin 20mG OD.      Neuropathy.   c/w home gabapentin 300mg TID. patient w/ known diagnosis x2 miscarriages      Beta thalassemia minor    patient w/ known diagnosis x2 miscarriages, will continue to monitor hgb, patient asymptomatic at this time    Dysphagia  S&S- dysphagia 3 soft diet with nectar thick fluids. Follow up outpatient for cineesophagogram.   Call Radiology at [phone] for outpatient scheduling.    PT- home with home PT    stable for discharge. follow up with PCP. repeat CT chest in 6-8 weeks. complete full course of levaquin

## 2018-09-03 NOTE — DISCHARGE NOTE ADULT - PATIENT PORTAL LINK FT
You can access the [facility] Patient Portal, offered by [facility], by registering with the following website: [email or web address]

## 2018-09-03 NOTE — DISCHARGE NOTE ADULT - CARE PLAN
Principal Discharge DX:	Pneumonia  Goal:	resolution  Assessment and plan of treatment:	complete full course of levaquin as prescribed  Secondary Diagnosis:	Beta thalassemia minor  Assessment and plan of treatment:	hemoglobin stable, follow up with PCP to continue to monitor Hgb levels.  Secondary Diagnosis:	HTN (hypertension)  Goal:	stable Principal Discharge DX:	Pneumonia  Goal:	resolution  Assessment and plan of treatment:	Pulmonary consulted- no evidence of RUBY infection. Complete full course of levaquin as prescribed. Repeat CT chest in 6-8 weeks to ensure clearance. Follow up with PCP within 1 week  Secondary Diagnosis:	Beta thalassemia minor  Assessment and plan of treatment:	hemoglobin stable, follow up with PCP to continue to monitor Hgb levels.  Secondary Diagnosis:	HTN (hypertension)  Goal:	stable Principal Discharge DX:	Pneumonia  Goal:	resolution  Assessment and plan of treatment:	Pulmonary consulted- no evidence of RUBY infection. Complete full course of levaquin as prescribed, every 48 hours for 2 more doses- take on 9/6 & 9/8. Repeat CT chest in 6-8 weeks to ensure clearance. Follow up with PCP within 1 week  Secondary Diagnosis:	Beta thalassemia minor  Assessment and plan of treatment:	hemoglobin stable, follow up with PCP to continue to monitor Hgb levels.  Secondary Diagnosis:	HTN (hypertension)  Goal:	stable  Assessment and plan of treatment:	continue losartan and procardia  Secondary Diagnosis:	Dysphagia  Assessment and plan of treatment:	dysphagia 3 soft diet with nectar thick fluids. Follow up outpatient for cineesophagogram.   Call Radiology at [phone] for outpatient scheduling.

## 2018-09-04 VITALS
SYSTOLIC BLOOD PRESSURE: 127 MMHG | DIASTOLIC BLOOD PRESSURE: 66 MMHG | TEMPERATURE: 97 F | RESPIRATION RATE: 18 BRPM | OXYGEN SATURATION: 99 % | HEART RATE: 73 BPM

## 2018-09-04 LAB
BUN SERPL-MCNC: 23 MG/DL — SIGNIFICANT CHANGE UP (ref 7–23)
CALCIUM SERPL-MCNC: 9 MG/DL — SIGNIFICANT CHANGE UP (ref 8.4–10.5)
CHLORIDE SERPL-SCNC: 106 MMOL/L — SIGNIFICANT CHANGE UP (ref 98–107)
CO2 SERPL-SCNC: 20 MMOL/L — LOW (ref 22–31)
CREAT SERPL-MCNC: 0.7 MG/DL — SIGNIFICANT CHANGE UP (ref 0.5–1.3)
GLUCOSE SERPL-MCNC: 186 MG/DL — HIGH (ref 70–99)
HCT VFR BLD CALC: 36.6 % — SIGNIFICANT CHANGE UP (ref 34.5–45)
HGB BLD-MCNC: 11.4 G/DL — LOW (ref 11.5–15.5)
L PNEUMO AG UR QL: NEGATIVE — SIGNIFICANT CHANGE UP
MCHC RBC-ENTMCNC: 20.1 PG — LOW (ref 27–34)
MCHC RBC-ENTMCNC: 31.1 % — LOW (ref 32–36)
MCV RBC AUTO: 64.7 FL — LOW (ref 80–100)
NRBC # FLD: 0 — SIGNIFICANT CHANGE UP
PLATELET # BLD AUTO: 206 K/UL — SIGNIFICANT CHANGE UP (ref 150–400)
PMV BLD: 10.2 FL — SIGNIFICANT CHANGE UP (ref 7–13)
POTASSIUM SERPL-MCNC: 3.7 MMOL/L — SIGNIFICANT CHANGE UP (ref 3.5–5.3)
POTASSIUM SERPL-SCNC: 3.7 MMOL/L — SIGNIFICANT CHANGE UP (ref 3.5–5.3)
RBC # BLD: 5.66 M/UL — HIGH (ref 3.8–5.2)
RBC # FLD: 17.4 % — HIGH (ref 10.3–14.5)
SODIUM SERPL-SCNC: 139 MMOL/L — SIGNIFICANT CHANGE UP (ref 135–145)
WBC # BLD: 9.51 K/UL — SIGNIFICANT CHANGE UP (ref 3.8–10.5)
WBC # FLD AUTO: 9.51 K/UL — SIGNIFICANT CHANGE UP (ref 3.8–10.5)

## 2018-09-04 RX ORDER — NIFEDIPINE 30 MG
1 TABLET, EXTENDED RELEASE 24 HR ORAL
Qty: 0 | Refills: 0 | COMMUNITY

## 2018-09-04 RX ORDER — GABAPENTIN 400 MG/1
1 CAPSULE ORAL
Qty: 0 | Refills: 0 | COMMUNITY

## 2018-09-04 RX ORDER — GABAPENTIN 400 MG/1
1 CAPSULE ORAL
Qty: 0 | Refills: 0 | COMMUNITY
Start: 2018-09-04

## 2018-09-04 RX ORDER — NIFEDIPINE 30 MG
1 TABLET, EXTENDED RELEASE 24 HR ORAL
Qty: 0 | Refills: 0 | COMMUNITY
Start: 2018-09-04

## 2018-09-04 RX ADMIN — LOSARTAN POTASSIUM 100 MILLIGRAM(S): 100 TABLET, FILM COATED ORAL at 05:39

## 2018-09-04 RX ADMIN — GABAPENTIN 300 MILLIGRAM(S): 400 CAPSULE ORAL at 17:07

## 2018-09-04 RX ADMIN — GABAPENTIN 300 MILLIGRAM(S): 400 CAPSULE ORAL at 05:39

## 2018-09-04 RX ADMIN — Medication 200 MILLIGRAM(S): at 08:08

## 2018-09-04 RX ADMIN — INFLUENZA VIRUS VACCINE 0.5 MILLILITER(S): 15; 15; 15; 15 SUSPENSION INTRAMUSCULAR at 16:16

## 2018-09-04 RX ADMIN — Medication 30 MILLIGRAM(S): at 05:39

## 2018-09-04 NOTE — PROGRESS NOTE ADULT - PROBLEM SELECTOR PLAN 1
symptomatic + radiologic findings   - will treat for CAP at this time, c/w Levaquin 750mG OD (will give 5-7 days) course.   - sputum cultures neg for RUBY.  - given this is second episode (first in January). will do speech and swallow.  - pt reports eating well. occasional cough with water but rarely.  - repeat CT chest in 6-8 weeks to ensure resolution of PNA.

## 2018-09-04 NOTE — PROGRESS NOTE ADULT - ASSESSMENT
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

## 2018-09-04 NOTE — SWALLOW BEDSIDE ASSESSMENT ADULT - ASR SWALLOW ASPIRATION MONITOR
change of breathing pattern/cough/gurgly voice/pneumonia/throat clearing/upper respiratory infection/oral hygiene/fever/position upright (89Y)

## 2018-09-04 NOTE — SWALLOW BEDSIDE ASSESSMENT ADULT - COMMENTS
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 Upon questioning, the patient reportedly occasionally coughs while drinking liquids. Additionally, patient reported a h/o globus sensation however no longer c/o globus sensation 2/2 to independent reduction in bolus size with smaller bites/sips. 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 Upon questioning, the patient reportedly occasionally coughs while drinking liquids. Additionally, patient reported a h/o globus sensation however no longer c/o globus sensation 2/2 to independent reduction in bolus size with smaller bites/sips.    MD team attempted to be contacted to discuss recommendations.

## 2018-09-04 NOTE — SWALLOW BEDSIDE ASSESSMENT ADULT - PHARYNGEAL PHASE
Decreased laryngeal elevation/Delayed pharyngeal swallow Delayed pharyngeal swallow/Decreased laryngeal elevation/Cough post oral intake

## 2018-09-04 NOTE — SWALLOW BEDSIDE ASSESSMENT ADULT - ASR SWALLOW RECOMMEND DIAG
Objective assessment is warranted at this time 2/2 to patient's respiratory status. This can be done as an outpatient with a prescription with a diagnosis. Call Radiology at [phone] for outpatient scheduling./VFSS/MBS

## 2018-09-04 NOTE — PROGRESS NOTE ADULT - PROBLEM SELECTOR PLAN 6
patient w/ known diagnosis x2 miscarriages, will continue to monitor hgb, patient asymptomatic at this time

## 2018-09-04 NOTE — PROGRESS NOTE ADULT - SUBJECTIVE AND OBJECTIVE BOX
Patient is a 89y old  Female who presents with a chief complaint of Cough (03 Sep 2018 09:25)      SUBJECTIVE / OVERNIGHT EVENTS:  Pt seen and examined at bedside.   No overnight event.  Feeling better.  no cp, no sob, no n/v/d.   cough stable.   the daughter at bedside.      Vital Signs Last 24 Hrs  T(C): 36.4 (04 Sep 2018 05:38), Max: 36.7 (03 Sep 2018 14:46)  T(F): 97.6 (04 Sep 2018 05:38), Max: 98.1 (03 Sep 2018 14:46)  HR: 64 (04 Sep 2018 05:38) (64 - 76)  BP: 135/67 (04 Sep 2018 05:38) (131/58 - 144/53)  BP(mean): 81 (04 Sep 2018 05:38) (81 - 81)  RR: 17 (04 Sep 2018 05:38) (17 - 18)  SpO2: 99% (04 Sep 2018 05:38) (99% - 100%)  I&O's Summary      PHYSICAL EXAM:  GENERAL: NAD, Comfortable, on room air.  HEAD:  Atraumatic, Normocephalic  EYES: EOMI, PERRLA, conjunctiva and sclera clear  NECK: Supple, No JVD  CHEST/LUNG: mild dec breath sounds bilaterally; No wheeze  HEART: Regular rate and rhythm; No murmurs, rubs, or gallops  ABDOMEN: Soft, Nontender, Nondistended; Bowel sounds present  Neuro: AAO x 3, no focal deficit, 5/5 b/l extremities  EXTREMITIES:  2+ Peripheral Pulses, No clubbing, cyanosis, or edema  SKIN: No rashes or lesions    LABS:                        11.4   9.51  )-----------( 206      ( 04 Sep 2018 05:12 )             36.6     09-04    139  |  106  |  23  ----------------------------<  186<H>  3.7   |  20<L>  |  0.70    Ca    9.0      04 Sep 2018 05:12  Phos  3.0     09-03  Mg     2.1     09-03        CAPILLARY BLOOD GLUCOSE                RADIOLOGY & ADDITIONAL TESTS:    Imaging Personally Reviewed:  [x] YES  [ ] NO    Consultant(s) Notes Reviewed:  [x] YES  [ ] NO      MEDICATIONS  (STANDING):  gabapentin 300 milliGRAM(s) Oral two times a day  influenza   Vaccine 0.5 milliLiter(s) IntraMuscular once  levoFLOXacin IVPB 750 milliGRAM(s) IV Intermittent every 48 hours  losartan 100 milliGRAM(s) Oral daily  NIFEdipine XL 30 milliGRAM(s) Oral daily  simvastatin 20 milliGRAM(s) Oral at bedtime    MEDICATIONS  (PRN):  guaiFENesin    Syrup 200 milliGRAM(s) Oral every 6 hours PRN Cough  melatonin 3 milliGRAM(s) Oral at bedtime PRN Insomnia      Care Discussed with Consultants/Other Providers [x] YES  [ ] NO    HEALTH ISSUES - PROBLEM Dx:  Neuropathy: Neuropathy  Need for prophylactic measure: Need for prophylactic measure  Beta thalassemia minor: Beta thalassemia minor  HLD (hyperlipidemia): HLD (hyperlipidemia)  HTN (hypertension): HTN (hypertension)  Pneumonia: Pneumonia

## 2018-09-04 NOTE — SWALLOW BEDSIDE ASSESSMENT ADULT - SWALLOW EVAL: DIAGNOSIS
1. The patient demonstrated a functional oral stage for puree, solids, honey thick, nectar thick, and thin liquid textures marked by adequate acceptance, bolus formation, A-P transport, and clearance. 2. The patient demonstrated a mild pharyngeal dysphagia for puree, solids, honey thick, and nectar thick liquid textures marked by delayed swallow trigger and reduced hyolaryngeal elevation upon digital palpation. No overt s/s of airway penetration/aspiration noted for puree, solids, honey thick, and nectar thick liquid textures. 3. The patient demonstrated a moderate-severe pharyngeal dysphagia for thin liquids marked by delayed swallow trigger and reduced hyolaryngeal elevation upon digital palpation with an immediate reflexive cough noted which is indicative of possible airway penetration/aspiration.

## 2018-09-05 LAB — BACTERIA SPT RESP CULT: SIGNIFICANT CHANGE UP

## 2018-09-06 LAB — BACTERIA SPT RESP CULT: SIGNIFICANT CHANGE UP

## 2018-09-17 PROBLEM — G03.9 MENINGITIS, UNSPECIFIED: Chronic | Status: ACTIVE | Noted: 2018-09-02

## 2018-09-17 PROBLEM — D56.3 THALASSEMIA MINOR: Chronic | Status: ACTIVE | Noted: 2018-09-02

## 2018-09-21 ENCOUNTER — APPOINTMENT (OUTPATIENT)
Dept: RADIOLOGY | Facility: HOSPITAL | Age: 83
End: 2018-09-21
Payer: COMMERCIAL

## 2018-09-21 ENCOUNTER — OUTPATIENT (OUTPATIENT)
Dept: OUTPATIENT SERVICES | Facility: HOSPITAL | Age: 83
LOS: 1 days | End: 2018-09-21
Payer: COMMERCIAL

## 2018-09-21 DIAGNOSIS — R13.10 DYSPHAGIA, UNSPECIFIED: ICD-10-CM

## 2018-09-21 PROCEDURE — 92611 MOTION FLUOROSCOPY/SWALLOW: CPT

## 2018-09-21 PROCEDURE — 74230 X-RAY XM SWLNG FUNCJ C+: CPT

## 2018-09-21 PROCEDURE — 74230 X-RAY XM SWLNG FUNCJ C+: CPT | Mod: 26

## 2018-10-08 ENCOUNTER — OUTPATIENT (OUTPATIENT)
Dept: OUTPATIENT SERVICES | Facility: HOSPITAL | Age: 83
LOS: 1 days | End: 2018-10-08
Payer: COMMERCIAL

## 2018-10-08 ENCOUNTER — APPOINTMENT (OUTPATIENT)
Dept: RADIOLOGY | Facility: HOSPITAL | Age: 83
End: 2018-10-08
Payer: COMMERCIAL

## 2018-10-08 DIAGNOSIS — Z00.00 ENCOUNTER FOR GENERAL ADULT MEDICAL EXAMINATION WITHOUT ABNORMAL FINDINGS: ICD-10-CM

## 2018-10-08 DIAGNOSIS — N13.70 VESICOURETERAL-REFLUX, UNSPECIFIED: ICD-10-CM

## 2018-10-08 PROCEDURE — 74241: CPT

## 2018-10-08 PROCEDURE — 74241: CPT | Mod: 26

## 2018-10-08 PROCEDURE — 74220 X-RAY XM ESOPHAGUS 1CNTRST: CPT

## 2018-10-14 LAB — ACID FAST STN SPT: SIGNIFICANT CHANGE UP

## 2018-11-03 NOTE — ED PROVIDER NOTE - OBJECTIVE STATEMENT
side rails up
89yF hx htn p/w cough x 1wk. Cough initially accompanied by sore throat and eye redness. Pt saw PMD on Weds for these symptoms and had CXR and strep test that were wnl. Was told she had a viral syndrome and sent home. Following discharge has had persistent cough with increased sputum and now has fevers, tmax 100. Had temp of 96 yesterday. Denies n/v/d or dysuria. Endorses intermittent sob and cp, worse with coughing. No recent travel or leg swelling.

## 2018-12-18 ENCOUNTER — APPOINTMENT (OUTPATIENT)
Dept: CT IMAGING | Facility: IMAGING CENTER | Age: 83
End: 2018-12-18
Payer: COMMERCIAL

## 2018-12-18 ENCOUNTER — OUTPATIENT (OUTPATIENT)
Dept: OUTPATIENT SERVICES | Facility: HOSPITAL | Age: 83
LOS: 1 days | End: 2018-12-18
Payer: COMMERCIAL

## 2018-12-18 DIAGNOSIS — J18.9 PNEUMONIA, UNSPECIFIED ORGANISM: ICD-10-CM

## 2018-12-18 PROCEDURE — 71250 CT THORAX DX C-: CPT

## 2018-12-18 PROCEDURE — 71250 CT THORAX DX C-: CPT | Mod: 26

## 2019-06-28 NOTE — PHYSICAL THERAPY INITIAL EVALUATION ADULT - DISCHARGE DISPOSITION, PT EVAL
home w/ home PT Complex Repair And Graft Additional Text (Will Appearing After The Standard Complex Repair Text): The complex repair was not sufficient to completely close the primary defect. The remaining additional defect was repaired with the graft mentioned below.

## 2019-10-07 NOTE — SWALLOW BEDSIDE ASSESSMENT ADULT - SWALLOW EVAL: RECOMMENDED FEEDING/EATING TECHNIQUES
alternate food with liquid/small sips/bites/check mouth frequently for oral residue/pocketing/crush medication (when feasible)/no straws/maintain upright posture during/after eating for 30 mins/oral hygiene/position upright (90 degrees) Yes

## 2022-05-20 NOTE — DISCHARGE NOTE ADULT - NS AS DC FOLLOWUP INST YN
Caller: Jose Paula    Relationship: Self    Best call back number: [phone]   What is the best time to reach you: ANYTIME  Who are you requesting to speak with (clinical staff, provider,  specific staff member): CLINICAL   Do you know the name of the person who called: THE PATIENT JOSE    What was the call regarding: JOSE REPORTS THAT HE HAD AN OFFICE VISIT AT THE PRACTICE TODAY AND FORGOT TO ASK WHAT HIS AIC IS.     Do you require a callback: YES, THE PATIENT IS REQUESTING A CALL BACK WITH HIS AIC LEVEL. PLEASE CALL AND ADVISE.    Yes

## 2022-06-29 NOTE — PHYSICAL THERAPY INITIAL EVALUATION ADULT - GAIT DISTANCE, PT EVAL
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 Admitted for further evaluation     Problem/Plan - 1:  ·  Problem: Wound of right foot with  Cellulitis with Left 4th toe OM .   ·  Plan: S/P  Right BKA   -podiatry and ID help appreciated.   -S/P   IV Meropenem  500mg q24hrs per iD .   - Pain management helping .  - Left foot prognosis is also guarded.      Problem/Plan - 2:  ·  Problem: PAD (peripheral artery disease).   ·  Plan: S/P RLE Angiogram but unable to do any intervention per Vascular.   -known PAD, continue asa/plavix. Pt denies any numbness or tingling.  -recent angiograms done w/ poor peripheral arterial flow per podiatry and so poor surgical candidate as above  -continue statin.     Problem/Plan - 3:  ·  Problem: ESRD (end stage renal disease) on dialysis.   ·  Plan: Renal helping with HD.   On MWF schedule.  -monitor I/O.     Problem/Plan - 4:  ·  Problem: HLD (hyperlipidemia).   ·  Plan: continue statin. check lipid profile.     Problem/Plan - 5:  ·  Problem: Constipation .   ·  Plan: Laxatives.      Problem/Plan - 6:  ·  Problem: Chronic Anemia with acute blood loss  .   ·  Plan: PRBC to keep Hgb 8 G.     Dispo : DC planning to [facility] pending placement        50 feet
